# Patient Record
Sex: MALE | Race: WHITE | NOT HISPANIC OR LATINO | Employment: STUDENT | URBAN - METROPOLITAN AREA
[De-identification: names, ages, dates, MRNs, and addresses within clinical notes are randomized per-mention and may not be internally consistent; named-entity substitution may affect disease eponyms.]

---
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
## 2021-02-15 ENCOUNTER — TELEMEDICINE (OUTPATIENT)
Dept: CARDIOLOGY | Facility: CLINIC | Age: 21
End: 2021-02-15

## 2021-02-15 VITALS — HEIGHT: 71 IN | BODY MASS INDEX: 23.52 KG/M2 | WEIGHT: 168 LBS

## 2021-02-15 DIAGNOSIS — R00.2 PALPITATIONS: ICD-10-CM

## 2021-02-15 DIAGNOSIS — F41.9 ANXIETY: ICD-10-CM

## 2021-02-15 DIAGNOSIS — R06.02 SOB (SHORTNESS OF BREATH): ICD-10-CM

## 2021-02-15 DIAGNOSIS — R07.2 PRECORDIAL PAIN: Primary | ICD-10-CM

## 2021-02-15 PROCEDURE — 99213 OFFICE O/P EST LOW 20 MIN: CPT | Performed by: INTERNAL MEDICINE

## 2021-02-15 RX ORDER — METOPROLOL SUCCINATE 25 MG/1
25 TABLET, EXTENDED RELEASE ORAL DAILY
Qty: 30 TABLET | Refills: 11 | Status: SHIPPED | OUTPATIENT
Start: 2021-02-15

## 2021-02-15 NOTE — PROGRESS NOTES
" Subjective:        Viktor Basilio is a 20 y.o. male who here for follow up    CC  Video  Palpitation during intercource  Palpitations during excercise  HPI  20-year-old male with a history of anxiety, palpitations here for the follow-up complaining of palpitations during intercourse as well as palpitations with exercise moderate in intensity     Problems Addressed this Visit        Cardiac and Vasculature    Precordial pain - Primary    Palpitations       Mental Health    Anxiety       Pulmonary and Pneumonias    SOB (shortness of breath)      Diagnoses       Codes Comments    Precordial pain    -  Primary ICD-10-CM: R07.2  ICD-9-CM: 786.51     Palpitations     ICD-10-CM: R00.2  ICD-9-CM: 785.1     Anxiety     ICD-10-CM: F41.9  ICD-9-CM: 300.00     SOB (shortness of breath)     ICD-10-CM: R06.02  ICD-9-CM: 786.05         .    The following portions of the patient's history were reviewed and updated as appropriate: allergies, current medications, past family history, past medical history, past social history, past surgical history and problem list.    Past Medical History:   Diagnosis Date   • Chest pain    • SOB (shortness of breath)      reports that he has never smoked. He has never used smokeless tobacco. He reports previous drug use. Drug: Marijuana. He reports that he does not drink alcohol.   Family History   Problem Relation Age of Onset   • Hypertension Father    • Heart attack Paternal Grandfather    • Hyperlipidemia Neg Hx    • Heart failure Neg Hx    • Heart disease Neg Hx        Review of Systems  Constitutional: No wt loss, fever, fatigue  Gastrointestinal: No nausea, abdominal pain  Behavioral/Psych: No insomnia or anxiety   Cardiovascular palpitations  Objective:       Physical Exam  Ht 180.3 cm (71\")   Wt 76.2 kg (168 lb)   BMI 23.43 kg/m²   General appearance: No acute changes   Neck: Trachea midline; NECK, supple, no thyromegaly or lymphadenopathy   Lungs: Breathing normally  CV: No obvious " abnormality   abdomen: Not distended  Extremities: No deformity , normal color , no peripheral edema   Skin: Normal temperature, turgor and texture; no rash, ulcers          Procedures      Echocardiogram:        Current Outpatient Medications:   •  escitalopram (LEXAPRO) 10 MG tablet, Take 1 tablet by mouth Daily., Disp: , Rfl:    Assessment:        Patient Active Problem List   Diagnosis   • Precordial pain   • SOB (shortness of breath)   • Sinus tachycardia   • Anxiety   • Palpitations               Plan:            ICD-10-CM ICD-9-CM   1. Precordial pain  R07.2 786.51   2. Palpitations  R00.2 785.1   3. Anxiety  F41.9 300.00   4. SOB (shortness of breath)  R06.02 786.05     1. Precordial pain  Atypical chest    2. Palpitations  Beta-blockers will be added to the current medications    3. Anxiety  Counseling has been done    4. SOB (shortness of breath)  Multifactorial     toprol xl 25 mg  For palpitations  Pros and cons of this new medication / change medication has been explained to  the patient    Possible side effects has been explained    Associated need of the blood  Work has been explained    Need for the compliance of the medication has been explained      See in 1 month    COUNSELING:    Viktor Rai was given to patient for the following topics: diagnostic results, risk factor reductions, impressions, risks and benefits of treatment options and importance of treatment compliance .       SMOKING COUNSELING:    [unfilled]    Dictated using Dragon dictation

## 2021-04-16 ENCOUNTER — BULK ORDERING (OUTPATIENT)
Dept: CASE MANAGEMENT | Facility: OTHER | Age: 21
End: 2021-04-16

## 2021-04-16 DIAGNOSIS — Z23 IMMUNIZATION DUE: ICD-10-CM

## 2022-10-26 ENCOUNTER — TELEPHONE (OUTPATIENT)
Dept: CARDIOLOGY | Facility: CLINIC | Age: 22
End: 2022-10-26

## 2022-10-26 NOTE — TELEPHONE ENCOUNTER
----- Message from Naomi Velazco sent at 10/24/2022  4:15 PM EDT -----  Regarding: CALLED AND SAID HE NEEDED A STRESS TEST AND ECHO FOR WORK  Contact: [phone]

## 2024-01-24 ENCOUNTER — OFFICE VISIT (OUTPATIENT)
Dept: CARDIOLOGY | Facility: CLINIC | Age: 24
End: 2024-01-24
Payer: COMMERCIAL

## 2024-01-24 VITALS
WEIGHT: 206 LBS | BODY MASS INDEX: 28.84 KG/M2 | DIASTOLIC BLOOD PRESSURE: 81 MMHG | SYSTOLIC BLOOD PRESSURE: 124 MMHG | HEIGHT: 71 IN | HEART RATE: 85 BPM

## 2024-01-24 DIAGNOSIS — R07.2 PRECORDIAL PAIN: Primary | ICD-10-CM

## 2024-01-24 DIAGNOSIS — R06.02 SOB (SHORTNESS OF BREATH): ICD-10-CM

## 2024-01-24 DIAGNOSIS — R00.2 PALPITATIONS: ICD-10-CM

## 2024-01-24 DIAGNOSIS — F41.9 ANXIETY: ICD-10-CM

## 2024-01-24 PROCEDURE — 1159F MED LIST DOCD IN RCRD: CPT | Performed by: NURSE PRACTITIONER

## 2024-01-24 PROCEDURE — 99214 OFFICE O/P EST MOD 30 MIN: CPT | Performed by: NURSE PRACTITIONER

## 2024-01-24 PROCEDURE — 93000 ELECTROCARDIOGRAM COMPLETE: CPT | Performed by: NURSE PRACTITIONER

## 2024-01-24 PROCEDURE — 1160F RVW MEDS BY RX/DR IN RCRD: CPT | Performed by: NURSE PRACTITIONER

## 2024-01-24 RX ORDER — METOPROLOL SUCCINATE 25 MG/1
25 TABLET, EXTENDED RELEASE ORAL DAILY
Qty: 30 TABLET | Refills: 11 | Status: SHIPPED | OUTPATIENT
Start: 2024-01-24

## 2024-01-24 NOTE — PROGRESS NOTES
Subjective:        Viktor Basilio is a 23 y.o. male who here for follow up    Chief Complaint   Patient presents with    Follow-up     1yr      He is here for a follow-up due to history of atypical chest pain and shortness of breath.    HPI    Drew Chetna is a 23-year-old male who is here today for a follow-up.  His EKG shows sinus rhythm with borderline right axis deviation and a heart rate of 74.  This is similar to his previous EKG.  He has a history of atypical chest pain, palpitations, shortness of breath and family history of heart disease.    Echo in 2019 revealed EF 69%, mild TV regurgitation, LAC size is mildly dilated, RBC is borderline dilated and no evidence of pericardial effusion.  Stress test at that time was a negative study.  His Holter monitor in 2020 was a normal study.    The following portions of the patient's history were reviewed and updated as appropriate: allergies, current medications, past family history, past medical history, past social history, past surgical history and problem list.    Past Medical History:   Diagnosis Date    Chest pain     SOB (shortness of breath)          reports that he has never smoked. He has never used smokeless tobacco. He reports that he does not currently use drugs after having used the following drugs: Marijuana. He reports that he does not drink alcohol.     Family History   Problem Relation Age of Onset    Hypertension Father     Heart attack Paternal Grandfather     Hyperlipidemia Neg Hx     Heart failure Neg Hx     Heart disease Neg Hx        ROS     Review of Systems  Constitutional: No wt loss, fever, fatigue  Gastrointestinal: No nausea, abdominal pain  Behavioral/Psych: No insomnia or anxiety  Cardiovascular: + chest pain, palps, and +shortness of breath.      Objective:           Physical Exam      ECG 12 Lead    Date/Time: 1/24/2024 10:42 AM  Performed by: Evelyn Branch APRN    Authorized by: Evelyn Branch APRN  Comparison:  compared with previous ECG from 1/5/2021  Similar to previous ECG  Rhythm: sinus rhythm    Clinical impression: non-specific ECG              Current Outpatient Medications:     escitalopram (LEXAPRO) 10 MG tablet, Take 1 tablet by mouth Daily., Disp: , Rfl:     metoprolol succinate XL (TOPROL-XL) 25 MG 24 hr tablet, Take 1 tablet by mouth Daily. (Patient not taking: Reported on 1/24/2024), Disp: 30 tablet, Rfl: 11     Assessment:        Patient Active Problem List   Diagnosis    Precordial pain    SOB (shortness of breath)    Sinus tachycardia    Anxiety    Palpitations               Plan:   1.  Palpitations: Comes and goes. He takes beta-blockade as needed. We will try to obtain records from hospital. Records reviewed from [facility]. He states in 4/2023 and they stated he had trigemety but converted to SR after having iv fluids. . He also states he just started on his lexapro again after being off for 2-3 years.     2.  History of atypical chest pain.  Defer to #1    3.  Shortness of breath: He will have an echo.    4. Chest pain: He will have a stress test.      It was explained to the patient that stress testing carries 85% specificity/sensitivity, and does not rule out future cardiac event.  Risks of the procedure were explained to the patient including shortness of breath, induction of myocardial infarction, and dizziness.  Patient is agreeable to proceeding with stress testing.                No diagnosis found.    There are no diagnoses linked to this encounter.    COUNSELING: liborio Rai was given to patient for the following topics: diagnostic results, risk factor reductions, impressions, risks and benefits of treatment options and importance of treatment compliance .       SMOKING COUNSELING: denies, former    Palps: He will have a holter monitor for 14 day. Refill BB. He may take half if needed for palps.   Shortness of breath: he will fo an echo.  Chest pain : she will have a  treadmill.    Sincerely,   FATEMEH Joseph  [facility]  01/24/24  10:37 EST    EMR Dragon/Transcription disclaimer: Dictated utilizing Dragon Dictation